# Patient Record
Sex: FEMALE | Race: OTHER
[De-identification: names, ages, dates, MRNs, and addresses within clinical notes are randomized per-mention and may not be internally consistent; named-entity substitution may affect disease eponyms.]

---

## 2020-01-06 PROBLEM — Z00.00 ENCOUNTER FOR PREVENTIVE HEALTH EXAMINATION: Status: ACTIVE | Noted: 2020-01-06

## 2020-01-08 ENCOUNTER — APPOINTMENT (OUTPATIENT)
Dept: INTERNAL MEDICINE | Facility: CLINIC | Age: 45
End: 2020-01-08

## 2021-10-22 ENCOUNTER — RESULT REVIEW (OUTPATIENT)
Age: 46
End: 2021-10-22

## 2021-11-19 ENCOUNTER — RESULT REVIEW (OUTPATIENT)
Age: 46
End: 2021-11-19

## 2022-04-06 ENCOUNTER — APPOINTMENT (OUTPATIENT)
Dept: ORTHOPEDIC SURGERY | Facility: CLINIC | Age: 47
End: 2022-04-06
Payer: OTHER MISCELLANEOUS

## 2022-04-06 VITALS — BODY MASS INDEX: 25.61 KG/M2 | WEIGHT: 150 LBS | HEIGHT: 64 IN

## 2022-04-06 PROCEDURE — 99072 ADDL SUPL MATRL&STAF TM PHE: CPT

## 2022-04-06 PROCEDURE — 99204 OFFICE O/P NEW MOD 45 MIN: CPT

## 2022-04-06 NOTE — PHYSICAL EXAM
[3___] : left triceps 3[unfilled]/5 [4___] : left grasp 4[unfilled]/5 [5___] : right grasp 5[unfilled]/5 [] : negative Hernandez reflex [Left Deltoid] : left deltoid [Left Radial Forearm] : left radial forearm [Left Ulnar Forearm] : left ulnar forearm
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
## 2022-04-06 NOTE — ASSESSMENT
[FreeTextEntry1] : 45 y/o F with cervical radiculopathy with worsening signs and symptoms of cervical myelopathy. \par Patient explained risks and benefits of ACDF. Explained that PT won't fix structural deformity and patient will continue to worsen without surgery and that PT will not be able to fix weakness of LUE. Patient expressed understanding and that she still is not ready to pursue surgery and wishes for more PT. \par PT rx and medication given. Patient to instructed to call the office with any further worsening weakness. \par Patient to follow up in four weeks or earlier if ready to undergo surgery.

## 2022-04-15 RX ORDER — CYCLOBENZAPRINE HYDROCHLORIDE 5 MG/1
5 TABLET, FILM COATED ORAL
Qty: 30 | Refills: 0 | Status: ACTIVE | COMMUNITY
Start: 2022-04-06 | End: 1900-01-01

## 2022-04-27 ENCOUNTER — APPOINTMENT (OUTPATIENT)
Dept: ORTHOPEDIC SURGERY | Facility: CLINIC | Age: 47
End: 2022-04-27
Payer: OTHER MISCELLANEOUS

## 2022-04-27 VITALS — HEIGHT: 64 IN | BODY MASS INDEX: 25.61 KG/M2 | WEIGHT: 150 LBS

## 2022-04-27 PROCEDURE — 96372 THER/PROPH/DIAG INJ SC/IM: CPT

## 2022-04-27 PROCEDURE — 99072 ADDL SUPL MATRL&STAF TM PHE: CPT

## 2022-04-27 PROCEDURE — 99214 OFFICE O/P EST MOD 30 MIN: CPT | Mod: 25

## 2022-04-27 NOTE — DISCUSSION/SUMMARY
[de-identified] : Still with radiaitng pain into the arms and legs, minimal relief with the gabapentin amd flexeril. Has not started PT yet. Will try toradol injection. She understands she is a candidate for ACDF, but is not ready for surgery. MRI C-spine indicated to eval the extent of compression.Will re-evaluate in 4 weeks.

## 2022-04-27 NOTE — PHYSICAL EXAM
[3___] : left triceps 3[unfilled]/5 [4___] : left grasp 4[unfilled]/5 [5___] : right grasp 5[unfilled]/5 [Left Deltoid] : left deltoid [Left Radial Forearm] : left radial forearm [Left Ulnar Forearm] : left ulnar forearm [Left lower extremity below knee] : left lower extremity below knee [Left lower extremity above knee] : left lower extremity above knee [] : negative Hernandez reflex

## 2022-04-27 NOTE — PROCEDURE
[Therapeutic Injection] : therapeutic injection [Left] : of the left [Other: ____] : [unfilled] [Pain] : pain [Alcohol] : alcohol [Ethyl Chloride sprayed topically] : ethyl chloride sprayed topically [Sterile technique used] : sterile technique used [___ cc    60mg] : Ketorolac (Toradol) ~Vcc of 60 mg  [] : Patient tolerated procedure well [Call if redness, pain or fever occur] : call if redness, pain or fever occur [Risks, benefits, alternatives discussed / Verbal consent obtained] : the risks benefits, and alternatives have been discussed, and verbal consent was obtained

## 2022-05-03 ENCOUNTER — APPOINTMENT (OUTPATIENT)
Dept: MRI IMAGING | Facility: CLINIC | Age: 47
End: 2022-05-03

## 2022-05-25 ENCOUNTER — APPOINTMENT (OUTPATIENT)
Dept: ORTHOPEDIC SURGERY | Facility: CLINIC | Age: 47
End: 2022-05-25
Payer: OTHER MISCELLANEOUS

## 2022-05-25 VITALS — WEIGHT: 150 LBS | HEIGHT: 64 IN | BODY MASS INDEX: 25.61 KG/M2

## 2022-05-25 PROCEDURE — 99072 ADDL SUPL MATRL&STAF TM PHE: CPT

## 2022-05-25 PROCEDURE — 99213 OFFICE O/P EST LOW 20 MIN: CPT

## 2022-05-25 NOTE — PHYSICAL EXAM
[5___] : right grasp 5[unfilled]/5 [Left Deltoid] : left deltoid [Left Radial Forearm] : left radial forearm [Left Ulnar Forearm] : left ulnar forearm [Left lower extremity below knee] : left lower extremity below knee [Left lower extremity above knee] : left lower extremity above knee [3___] : right extensor hallicus longus 3[unfilled]/5 [] : negative Hernandez reflex
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
## 2022-05-25 NOTE — DISCUSSION/SUMMARY
[de-identified] : Still with radiaitng pain into the arms and legs, minimal relief with the gabapentin amd flexeril. Has not started PT yet. She understands she is a candidate for ACDF, but is not ready for surgery. She understands the risks of not having surgery. MRI C-spine indicated to eval the extent of compression. There is increasing left sided low back pain with radiculopathy. MRI L-spine is also indicated to r/o HNP. Will re-evaluate in 4 weeks.

## 2022-05-27 ENCOUNTER — APPOINTMENT (OUTPATIENT)
Dept: MRI IMAGING | Facility: CLINIC | Age: 47
End: 2022-05-27

## 2022-11-02 ENCOUNTER — APPOINTMENT (OUTPATIENT)
Dept: ORTHOPEDIC SURGERY | Facility: CLINIC | Age: 47
End: 2022-11-02

## 2022-11-02 PROCEDURE — J3490M: CUSTOM

## 2022-11-02 PROCEDURE — 99215 OFFICE O/P EST HI 40 MIN: CPT | Mod: 25

## 2022-11-02 PROCEDURE — 99072 ADDL SUPL MATRL&STAF TM PHE: CPT

## 2022-11-02 PROCEDURE — 99214 OFFICE O/P EST MOD 30 MIN: CPT | Mod: 25

## 2022-11-02 PROCEDURE — 20552 NJX 1/MLT TRIGGER POINT 1/2: CPT

## 2022-11-02 NOTE — PHYSICAL EXAM
[5___] : right grasp 5[unfilled]/5 [Left Deltoid] : left deltoid [Left Radial Forearm] : left radial forearm [Left Ulnar Forearm] : left ulnar forearm [3___] : right extensor hallicus longus 3[unfilled]/5 [Left lower extremity below knee] : left lower extremity below knee [Left lower extremity above knee] : left lower extremity above knee [] : negative Hernandez reflex
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
## 2022-11-02 NOTE — DISCUSSION/SUMMARY
[de-identified] : REVIEWED THE updated mris and discussion of tx options - \par \par IN THE NECK HAS CORD COMPRESSION c5-6 - WOULD REc SURGERY FOR THIS - has disc hernaitions at C3-5 with NF narrowing \par my recommendation for this WOULD BE ACDF c4-6 \par discussion of this and the risks of not having surgery \par cant fix cord injury after \par poor natural history of the condition if left alone \par \par discussed the surgery - she is going to think about it \par \par in the lumbar spine would rec conservative care \par TPi done in the lower lumbar today- tolerated\par pain managemnent \par LSO script for the lumbar \par \par fu 3 months -\par  if would like to have the cervical procedure call to schedule

## 2022-11-02 NOTE — PROCEDURE
[Trigger point 1-2 muscle groups] : trigger point 1-2 muscle groups [Left] : of the left [Lumbar paraspinal muscle] : lumbar paraspinal muscle [Alcohol] : alcohol [Betadine] : betadine [Ethyl Chloride sprayed topically] : ethyl chloride sprayed topically [___ cc    1%] : Lidocaine ~Vcc of 1%  [___ cc    0.25%] : Bupivacaine (Marcaine) ~Vcc of 0.25%  [___ cc    10mg] : Triamcinolone (Kenalog) ~Vcc of 10 mg

## 2022-11-20 ENCOUNTER — FORM ENCOUNTER (OUTPATIENT)
Age: 47
End: 2022-11-20

## 2022-12-20 ENCOUNTER — FORM ENCOUNTER (OUTPATIENT)
Age: 47
End: 2022-12-20

## 2023-01-04 ENCOUNTER — APPOINTMENT (OUTPATIENT)
Dept: ORTHOPEDIC SURGERY | Facility: CLINIC | Age: 48
End: 2023-01-04

## 2023-01-16 ENCOUNTER — FORM ENCOUNTER (OUTPATIENT)
Age: 48
End: 2023-01-16

## 2023-01-25 ENCOUNTER — APPOINTMENT (OUTPATIENT)
Dept: ORTHOPEDIC SURGERY | Facility: CLINIC | Age: 48
End: 2023-01-25
Payer: OTHER MISCELLANEOUS

## 2023-01-25 VITALS — BODY MASS INDEX: 25.61 KG/M2 | HEIGHT: 64 IN | WEIGHT: 150 LBS

## 2023-01-25 PROCEDURE — 99072 ADDL SUPL MATRL&STAF TM PHE: CPT

## 2023-01-25 PROCEDURE — 99213 OFFICE O/P EST LOW 20 MIN: CPT

## 2023-01-25 NOTE — DISCUSSION/SUMMARY
[de-identified] : In the neck she has cord compression at  C5-6 - would recommend surgery for this  - has disc herniations at C3-5 with NF narrowing \par ACDF C4-6 was recommended\par discussion of this and the risks of not having surgery \par cant fix cord injury after \par poor natural history of the condition if left alone \par \par still considering surgery \par for now wants to do conservative care; PT and acupuncture \par Rx for lidoderm patches \par \par fu 2 months -\par  if would like to have the cervical procedure call to schedule \par \par Patient seen by "Emmanuelle DAY" under the supervision of "Dr. Herrera Jacobs"\par

## 2023-01-25 NOTE — PHYSICAL EXAM
[5___] : right grasp 5[unfilled]/5 [Left Deltoid] : left deltoid [Left Radial Forearm] : left radial forearm [Left Ulnar Forearm] : left ulnar forearm [3___] : right extensor hallicus longus 3[unfilled]/5 [Left lower extremity below knee] : left lower extremity below knee [Left lower extremity above knee] : left lower extremity above knee [] : negative Hernandez reflex [de-identified] : grossly weak on the left side
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
## 2023-01-25 NOTE — DATA REVIEWED
Please also remind patient to repeat BMP for his critical potassium level. C   [MRI] : MRI [Cervical Spine] : cervical spine [Lumbar Spine] : lumbar spine

## 2023-03-29 ENCOUNTER — APPOINTMENT (OUTPATIENT)
Dept: ORTHOPEDIC SURGERY | Facility: CLINIC | Age: 48
End: 2023-03-29
Payer: OTHER MISCELLANEOUS

## 2023-03-29 VITALS — WEIGHT: 150 LBS | HEIGHT: 64 IN | BODY MASS INDEX: 25.61 KG/M2

## 2023-03-29 DIAGNOSIS — Z78.9 OTHER SPECIFIED HEALTH STATUS: ICD-10-CM

## 2023-03-29 PROCEDURE — 20552 NJX 1/MLT TRIGGER POINT 1/2: CPT

## 2023-03-29 PROCEDURE — J3490M: CUSTOM

## 2023-03-29 PROCEDURE — 99214 OFFICE O/P EST MOD 30 MIN: CPT | Mod: 25

## 2023-03-29 NOTE — DISCUSSION/SUMMARY
[de-identified] : In the neck she has cord compression at  C5-6 - would recommend surgery for this  - has disc herniations at C3-5 with NF narrowing \par ACDF C4-6 was recommended\par discussion of this and the risks of not having surgery \par cant fix cord injury after \par poor natural history of the condition if left alone \par TPI tolerated well today \par still considering surgery \par for now wants to do conservative care; PT and acupuncture \par Rx for lidoderm patches \par \par fu 2 months -\par  if would like to have the cervical procedure call to schedule \par \par

## 2023-03-29 NOTE — WORK
[Does not reveal pre-existing condition(s) that may affect treatment/prognosis] : does not reveal pre-existing condition(s) that may affect treatment/prognosis [Cannot return to work because ________] : cannot return to work because [unfilled] [Rx may affect patient's ability to return to work, make patient drowsy, or other issue] : Rx may affect patient's ability to return to work, make patient drowsy, or other issue. [I provided the services listed above] :  I provided the services listed above. [Total (100%)] : total (100%) [Less than Sedentary Work:] :  Less than Sedentary Work: Unable to meet the requirement of Sedentary Work.

## 2023-03-29 NOTE — PHYSICAL EXAM
[Left Deltoid] : left deltoid [Left Radial Forearm] : left radial forearm [Left Ulnar Forearm] : left ulnar forearm [3___] : right extensor hallicus longus 3[unfilled]/5 [Left lower extremity below knee] : left lower extremity below knee [Left lower extremity above knee] : left lower extremity above knee [4___] : right grasp 4[unfilled]/5 [] : negative Hernandez reflex [de-identified] : grossly weak on the left side
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
## 2023-05-31 ENCOUNTER — APPOINTMENT (OUTPATIENT)
Dept: ORTHOPEDIC SURGERY | Facility: CLINIC | Age: 48
End: 2023-05-31

## 2023-06-27 ENCOUNTER — FORM ENCOUNTER (OUTPATIENT)
Age: 48
End: 2023-06-27

## 2023-06-29 ENCOUNTER — FORM ENCOUNTER (OUTPATIENT)
Age: 48
End: 2023-06-29

## 2023-08-30 ENCOUNTER — APPOINTMENT (OUTPATIENT)
Dept: ORTHOPEDIC SURGERY | Facility: CLINIC | Age: 48
End: 2023-08-30
Payer: OTHER MISCELLANEOUS

## 2023-08-30 VITALS — BODY MASS INDEX: 25.61 KG/M2 | WEIGHT: 150 LBS | HEIGHT: 64 IN

## 2023-08-30 PROCEDURE — 99214 OFFICE O/P EST MOD 30 MIN: CPT

## 2023-08-30 PROCEDURE — 99213 OFFICE O/P EST LOW 20 MIN: CPT

## 2023-08-30 RX ORDER — LIDOCAINE 5% 700 MG/1
5 PATCH TOPICAL
Qty: 1 | Refills: 2 | Status: ACTIVE | COMMUNITY
Start: 2023-01-25 | End: 1900-01-01
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
## 2023-08-30 NOTE — PHYSICAL EXAM
[4___] : right triceps 4[unfilled]/5 [Left Deltoid] : left deltoid [Left Radial Forearm] : left radial forearm [Left Ulnar Forearm] : left ulnar forearm [Left lower extremity below knee] : left lower extremity below knee [Left lower extremity above knee] : left lower extremity above knee [3___] : right grasp 3[unfilled]/5 [] : negative Hernandez reflex [de-identified] : grossly weak B/L hands

## 2023-08-30 NOTE — DISCUSSION/SUMMARY
[de-identified] : In the neck she has cord compression at  C5-6 - would recommend surgery for this  - has disc herniations at C3-5 with NF narrowing  ACDF C4-6 was recommended discussion of this and the risks of not having surgery  cant fix cord injury after  poor natural history of the condition if left alone  she is still not considering surgery  for now wants to do conservative care; Rx for Aqua PT Rx for lidoderm patches  MDP  fu 2 months -  if would like to have the cervical procedure call to schedule   Patient seen by "Emmanuelle DAY" under the supervision of "Dr. Herrera Jacobs"

## 2023-08-30 NOTE — WORK
[Total (100%)] : total (100%) [Does not reveal pre-existing condition(s) that may affect treatment/prognosis] : does not reveal pre-existing condition(s) that may affect treatment/prognosis [Cannot return to work because ________] : cannot return to work because [unfilled] [Rx may affect patient's ability to return to work, make patient drowsy, or other issue] : Rx may affect patient's ability to return to work, make patient drowsy, or other issue. [I provided the services listed above] :  I provided the services listed above. [Less than Sedentary Work:] :  Less than Sedentary Work: Unable to meet the requirement of Sedentary Work.

## 2023-10-25 ENCOUNTER — APPOINTMENT (OUTPATIENT)
Dept: ORTHOPEDIC SURGERY | Facility: CLINIC | Age: 48
End: 2023-10-25
Payer: OTHER MISCELLANEOUS

## 2023-10-25 ENCOUNTER — APPOINTMENT (OUTPATIENT)
Dept: ORTHOPEDIC SURGERY | Facility: CLINIC | Age: 48
End: 2023-10-25

## 2023-10-25 VITALS — WEIGHT: 150 LBS | BODY MASS INDEX: 25.61 KG/M2 | HEIGHT: 64 IN

## 2023-10-25 DIAGNOSIS — M54.16 RADICULOPATHY, LUMBAR REGION: ICD-10-CM

## 2023-10-25 PROCEDURE — 99213 OFFICE O/P EST LOW 20 MIN: CPT | Mod: ACP

## 2024-01-03 ENCOUNTER — APPOINTMENT (OUTPATIENT)
Dept: ORTHOPEDIC SURGERY | Facility: CLINIC | Age: 49
End: 2024-01-03

## 2024-01-10 ENCOUNTER — APPOINTMENT (OUTPATIENT)
Dept: ORTHOPEDIC SURGERY | Facility: CLINIC | Age: 49
End: 2024-01-10
Payer: OTHER MISCELLANEOUS

## 2024-01-10 VITALS — BODY MASS INDEX: 25.61 KG/M2 | HEIGHT: 64 IN | WEIGHT: 150 LBS

## 2024-01-10 DIAGNOSIS — M54.12 RADICULOPATHY, CERVICAL REGION: ICD-10-CM

## 2024-01-10 DIAGNOSIS — M50.20 OTHER CERVICAL DISC DISPLACEMENT, UNSPECIFIED CERVICAL REGION: ICD-10-CM

## 2024-01-10 PROCEDURE — 99214 OFFICE O/P EST MOD 30 MIN: CPT

## 2024-01-10 NOTE — PHYSICAL EXAM
[4___] : right triceps 4[unfilled]/5 [3___] : right extensor hallicus longus 3[unfilled]/5 [Left lower extremity below knee] : left lower extremity below knee [Left lower extremity above knee] : left lower extremity above knee [] : negative Hernandez reflex [de-identified] : grossly weak B/L hands

## 2024-01-10 NOTE — DISCUSSION/SUMMARY
[Medication Risks Reviewed] : Medication risks reviewed [Surgical risks reviewed] : Surgical risks reviewed [de-identified] : In the neck she has cord compression at  C5-6 - would recommend surgery for this  - has disc herniations at C3-5 with NF narrowing  worsening MRi finding ACDF C4-7was recommended discussion of this and the risks of not having surgery  cant fix cord injury after  poor natural history of the condition if left alone  she is still not interested in  surgery  for now wants to do conservative care; continue PT  Rx for lidoderm patches  not able to return to work
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
## 2024-04-10 ENCOUNTER — APPOINTMENT (OUTPATIENT)
Dept: ORTHOPEDIC SURGERY | Facility: CLINIC | Age: 49
End: 2024-04-10
Payer: OTHER MISCELLANEOUS

## 2024-04-10 VITALS — HEIGHT: 64 IN | BODY MASS INDEX: 25.61 KG/M2 | WEIGHT: 150 LBS

## 2024-04-10 DIAGNOSIS — M48.02 SPINAL STENOSIS, CERVICAL REGION: ICD-10-CM

## 2024-04-10 PROCEDURE — 99214 OFFICE O/P EST MOD 30 MIN: CPT

## 2024-04-10 RX ORDER — METHYLPREDNISOLONE 4 MG/1
4 TABLET ORAL
Qty: 21 | Refills: 0 | Status: ACTIVE | COMMUNITY
Start: 2024-04-10 | End: 1900-01-01

## 2024-04-11 NOTE — DISCUSSION/SUMMARY
[Medication Risks Reviewed] : Medication risks reviewed [Surgical risks reviewed] : Surgical risks reviewed [de-identified] : In the neck she has cord compression in the cervical spine  - would recommend surgery for this  - has disc herniations at C3-5 with NF narrowing  worsening MRi finding ACDF C4-7 is and continues to be recommended - particularly in light of worsening symptoms  discussion of this and the risks of not having surgery  cant fix cord injury after  neurologic injury not something we can "fix it later" poor natural history of the condition if left alone  she is still not interested in surgery  for now wants to do conservative care; continue PT  advised that this is not what I would particularly recommend but ultimately it is her decision  Will follow up in three months

## 2024-04-11 NOTE — PHYSICAL EXAM
[4___] : right triceps 4[unfilled]/5 [3___] : right extensor hallicus longus 3[unfilled]/5 [Left lower extremity below knee] : left lower extremity below knee [Left lower extremity above knee] : left lower extremity above knee [] : negative Hernandez reflex [de-identified] : grossly weak B/L hands
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
## 2024-04-24 RX ORDER — METHYLPREDNISOLONE 4 MG/1
4 TABLET ORAL
Qty: 1 | Refills: 0 | Status: ACTIVE | COMMUNITY
Start: 2023-08-30 | End: 1900-01-01

## 2024-05-02 RX ORDER — GABAPENTIN 300 MG/1
300 CAPSULE ORAL TWICE DAILY
Qty: 60 | Refills: 0 | Status: ACTIVE | COMMUNITY
Start: 2022-04-06 | End: 1900-01-01

## 2024-07-10 ENCOUNTER — APPOINTMENT (OUTPATIENT)
Dept: ORTHOPEDIC SURGERY | Facility: CLINIC | Age: 49
End: 2024-07-10
Payer: OTHER MISCELLANEOUS

## 2024-07-10 DIAGNOSIS — M48.02 SPINAL STENOSIS, CERVICAL REGION: ICD-10-CM

## 2024-07-10 DIAGNOSIS — M54.12 RADICULOPATHY, CERVICAL REGION: ICD-10-CM

## 2024-07-10 DIAGNOSIS — M54.16 RADICULOPATHY, LUMBAR REGION: ICD-10-CM

## 2024-07-10 DIAGNOSIS — M50.20 OTHER CERVICAL DISC DISPLACEMENT, UNSPECIFIED CERVICAL REGION: ICD-10-CM

## 2024-07-10 PROCEDURE — 99214 OFFICE O/P EST MOD 30 MIN: CPT

## 2024-08-07 ENCOUNTER — APPOINTMENT (OUTPATIENT)
Dept: ORTHOPEDIC SURGERY | Facility: CLINIC | Age: 49
End: 2024-08-07

## 2024-11-06 ENCOUNTER — APPOINTMENT (OUTPATIENT)
Dept: ORTHOPEDIC SURGERY | Facility: CLINIC | Age: 49
End: 2024-11-06
Payer: OTHER MISCELLANEOUS

## 2024-11-06 VITALS — BODY MASS INDEX: 25.61 KG/M2 | HEIGHT: 64 IN | WEIGHT: 150 LBS

## 2024-11-06 DIAGNOSIS — M54.12 RADICULOPATHY, CERVICAL REGION: ICD-10-CM

## 2024-11-06 DIAGNOSIS — M54.16 RADICULOPATHY, LUMBAR REGION: ICD-10-CM

## 2024-11-06 DIAGNOSIS — M48.02 SPINAL STENOSIS, CERVICAL REGION: ICD-10-CM

## 2024-11-06 PROCEDURE — 99214 OFFICE O/P EST MOD 30 MIN: CPT

## 2024-12-18 ENCOUNTER — APPOINTMENT (OUTPATIENT)
Dept: ORTHOPEDIC SURGERY | Facility: CLINIC | Age: 49
End: 2024-12-18

## 2025-02-05 ENCOUNTER — APPOINTMENT (OUTPATIENT)
Dept: ORTHOPEDIC SURGERY | Facility: CLINIC | Age: 50
End: 2025-02-05
Payer: OTHER MISCELLANEOUS

## 2025-02-05 DIAGNOSIS — M50.20 OTHER CERVICAL DISC DISPLACEMENT, UNSPECIFIED CERVICAL REGION: ICD-10-CM

## 2025-02-05 DIAGNOSIS — M54.16 RADICULOPATHY, LUMBAR REGION: ICD-10-CM

## 2025-02-05 DIAGNOSIS — M48.02 SPINAL STENOSIS, CERVICAL REGION: ICD-10-CM

## 2025-02-05 DIAGNOSIS — M54.12 RADICULOPATHY, CERVICAL REGION: ICD-10-CM

## 2025-02-05 PROCEDURE — 99214 OFFICE O/P EST MOD 30 MIN: CPT

## 2025-03-19 ENCOUNTER — APPOINTMENT (OUTPATIENT)
Dept: ORTHOPEDIC SURGERY | Facility: CLINIC | Age: 50
End: 2025-03-19

## 2025-05-07 ENCOUNTER — APPOINTMENT (OUTPATIENT)
Dept: ORTHOPEDIC SURGERY | Facility: CLINIC | Age: 50
End: 2025-05-07
Payer: OTHER MISCELLANEOUS

## 2025-05-07 VITALS — WEIGHT: 150 LBS | HEIGHT: 64 IN | BODY MASS INDEX: 25.61 KG/M2

## 2025-05-07 DIAGNOSIS — M54.16 RADICULOPATHY, LUMBAR REGION: ICD-10-CM

## 2025-05-07 DIAGNOSIS — M48.02 SPINAL STENOSIS, CERVICAL REGION: ICD-10-CM

## 2025-05-07 DIAGNOSIS — M54.12 RADICULOPATHY, CERVICAL REGION: ICD-10-CM

## 2025-05-07 PROCEDURE — 99214 OFFICE O/P EST MOD 30 MIN: CPT

## 2025-07-23 ENCOUNTER — APPOINTMENT (OUTPATIENT)
Dept: ORTHOPEDIC SURGERY | Facility: CLINIC | Age: 50
End: 2025-07-23
Payer: OTHER MISCELLANEOUS

## 2025-07-23 DIAGNOSIS — M50.20 OTHER CERVICAL DISC DISPLACEMENT, UNSPECIFIED CERVICAL REGION: ICD-10-CM

## 2025-07-23 DIAGNOSIS — M54.12 RADICULOPATHY, CERVICAL REGION: ICD-10-CM

## 2025-07-23 DIAGNOSIS — M48.02 SPINAL STENOSIS, CERVICAL REGION: ICD-10-CM

## 2025-07-23 DIAGNOSIS — M54.16 RADICULOPATHY, LUMBAR REGION: ICD-10-CM

## 2025-07-23 PROCEDURE — 99214 OFFICE O/P EST MOD 30 MIN: CPT

## 2025-07-23 RX ORDER — MELOXICAM 15 MG/1
15 TABLET ORAL
Qty: 30 | Refills: 1 | Status: ACTIVE | COMMUNITY
Start: 2025-07-23 | End: 1900-01-01